# Patient Record
Sex: FEMALE | Race: BLACK OR AFRICAN AMERICAN | Employment: OTHER | URBAN - METROPOLITAN AREA
[De-identification: names, ages, dates, MRNs, and addresses within clinical notes are randomized per-mention and may not be internally consistent; named-entity substitution may affect disease eponyms.]

---

## 2023-01-12 ENCOUNTER — OFFICE VISIT (OUTPATIENT)
Dept: HEMATOLOGY | Age: 63
End: 2023-01-12
Payer: MEDICAID

## 2023-01-12 VITALS
OXYGEN SATURATION: 98 % | TEMPERATURE: 97.6 F | HEIGHT: 63 IN | WEIGHT: 180.4 LBS | BODY MASS INDEX: 31.96 KG/M2 | HEART RATE: 71 BPM | RESPIRATION RATE: 18 BRPM | SYSTOLIC BLOOD PRESSURE: 142 MMHG | DIASTOLIC BLOOD PRESSURE: 78 MMHG

## 2023-01-12 DIAGNOSIS — B19.10 HEPATITIS B INFECTION WITHOUT DELTA AGENT WITHOUT HEPATIC COMA, UNSPECIFIED CHRONICITY: Primary | ICD-10-CM

## 2023-01-12 RX ORDER — ATORVASTATIN CALCIUM 20 MG/1
20 TABLET, FILM COATED ORAL DAILY
COMMUNITY
Start: 2022-12-07

## 2023-01-12 RX ORDER — MONTELUKAST SODIUM 10 MG/1
TABLET ORAL
COMMUNITY
Start: 2022-11-09

## 2023-01-12 RX ORDER — FLUTICASONE FUROATE, UMECLIDINIUM BROMIDE AND VILANTEROL TRIFENATATE 200; 62.5; 25 UG/1; UG/1; UG/1
POWDER RESPIRATORY (INHALATION)
COMMUNITY
Start: 2022-12-22

## 2023-01-12 RX ORDER — AMLODIPINE BESYLATE 5 MG/1
5 TABLET ORAL DAILY
COMMUNITY
Start: 2022-11-25

## 2023-01-12 RX ORDER — ALBUTEROL SULFATE 90 UG/1
AEROSOL, METERED RESPIRATORY (INHALATION)
COMMUNITY
Start: 2022-12-26

## 2023-01-12 RX ORDER — ACETAMINOPHEN 500 MG
TABLET ORAL DAILY
COMMUNITY

## 2023-01-12 NOTE — PROGRESS NOTES
Identified pt with two pt identifiers(name and ). Reviewed record in preparation for visit and have obtained necessary documentation. Chief Complaint   Patient presents with    New Patient     Establish care      Vitals:    23 0943   BP: (!) 142/78   Pulse: 71   Resp: 18   Temp: 97.6 °F (36.4 °C)   TempSrc: Temporal   SpO2: 98%   Weight: 180 lb 6.4 oz (81.8 kg)   Height: 5' 3\" (1.6 m)   PainSc:   0 - No pain       Health Maintenance Review: Patient reminded of \"due or due soon\" health maintenance. I have asked the patient to contact his/her primary care provider (PCP) for follow-up on his/her health maintenance. Coordination of Care Questionnaire:  :   1) Have you been to an emergency room, urgent care, or hospitalized since your last visit? If yes, where when, and reason for visit? no       2. Have seen or consulted any other health care provider since your last visit? If yes, where when, and reason for visit? NO      Patient is accompanied by daughter I have received verbal consent from Raisa Chaudhary to discuss any/all medical information while they are present in the room.

## 2023-01-12 NOTE — PROGRESS NOTES
[facility] [address] [facility], [place], [place], [facility] [place]      MERI Sandoval S Chela, AGPCNP-BC   Jesus Penaloza, [facility]-   Masha rAzate, FNP-TERRIE Weston, FNP-C   Sharon Balbuena, AGPCNP-BC      [address]   at [address], [address]   [phone]   FAX: [address]   at [address], [address] [place] [address], [address]   [phone]   FAX: [phone]         Patient Care Team:  Sree Jarquin NP as PCP - General (Nurse Practitioner)      Problem List  Date Reviewed: 2016            Codes Class Noted    Essential hypertension ICD-10-CM: I10  ICD-9-CM: 401.9  2016        Allergic rhinitis ICD-10-CM: J30.9  ICD-9-CM: 477.9  2016        Major depressive disorder ICD-10-CM: F32.9  ICD-9-CM: 296.20  2016        Vitamin D deficiency ICD-10-CM: E55.9  ICD-9-CM: 268.9  2016         Routine testing during COVID vaccination. Had 30 years ago, undetected. Resolved again. Screen for HCV & HBV. Scelral interus, no idea of risk factor. Mom:HTN, DMII, strove  Dad: HTN, DMII, heart attack  Daughter passed, liver cancer, HCV. Single, 2 kids, one   CMA, report   Cigarette  Social    The clinicians listed above have asked me to see Ijeoma Kelley in consultation regarding chronic HBV and its management. All medical records sent by the referring physicians were reviewed   including imaging studies   and pathology. No medical records were available for review when the patient was here for the appointment. The patient is a 60y.o. year old [de-identified] female who has tested positive for HBV approximatley 30 years ago. Risk factors for acquiring HBV are not apparent.        There was an episode of acute icteric hepatitis at the time of initial diagnosis . Imaging of the liver was not performed. An assessment of liver fibrosis with biopsy or elastography has not been performed. The patient has never received treatment for chronic HBV. The patient does not have any symptoms which could be attributed to the liver disorder. has the following symptoms which could be attributed to the liver disorder:    fatigue,   fevers,   chills,   shortness of breath,   chest pain,   pain in the right side over the liver,   diffuse abdominal pain,   nausea,   vomiting,   constipation,   diarrhea,   dry eyes,   dry mouth,   arthralgias,   myalgias,   yellowing of the eyes or skin,   itching,   dark urine,   problems concentrating,   swelling of the abdomen,   swelling of the lower extremities,   hematemesis,   hematochezia. The patient is not experiencing the following symptoms which are commonly seen in this liver disorder:   fatigue,   fevers,   chills,   shortness of breath,   chest pain,   pain in the right side over the liver,   diffuse abdominal pain,   nausea,   vomiting,   constipation,   diarrhea,   dry eyes,   dry mouth,   arthralgias,   myalgias,   yellowing of the eyes or skin,   itching,   dark urine,   problems concentrating,   swelling of the abdomen,   swelling of the lower extremities,   hematemesis,   hematochezia. The patient   completes all daily activities without any functional limitations. has   Mild   Moderate   Severe   limitations in functional activities which can be attributed to   the liver disease   and   to other medical problems that are not related to the liver disease. ASSESSMENT AND PLAN:  Chronic HBV  This is   E-antigen positive active   E-antigen negative active   E-antigen negative inactive   HBV. It has not yet been determined if this is active or inactive HBV. There is   no fibrosis. portal fibrosis. bridging fibrosis. cirrhosis.     The degree of fibrosis has not yet been determined. Liver transaminases are   normal.    elevated. The AST is  normal.    elevated. The ALT is  normal.    elevated. The ALP is  normal.    elevated. Liver function is   normal.    depressed. Total bilirubin is   normal.    elevated. Serum albumin is   normal.    depressed. INR is   normal.    prolonged. The platelet count is   normal.    depressed. Based upon laboratory studies   elastography,   and imaging   the patient   does not appear to have   appears to have   may have   significant liver linjury  advanced liver disease   or  cirrhosis. The level of HBV DNA   is ***.  has not yet been determined. The patient   is not currently receiving treatment for HBV. is receiving treatment with   Will initiate treatment with   tenofovir (Viread)  tenofovir (Vemlidy). entecavir. peginterferon. The patient does not require treatment for HBV because this is inactive disease. The patient does not need treatment for HBV prior to delivery of the baby. With such low levels of HBV DNA there is essentially no risk for the baby to be infected as long as the baby is vaccinated for HBV with HBIG within 1 hour of birth and initiation of the first of 3 doses of HBV vaccine. The patient  is responding to   and tolerating treatment without significant side effects. treatment but having side effects which include ***.  is not responding to the treatment. Will perform   Have performed   laboratory testing to monitor liver function and degree of liver injury. This   will include   has included   BMP,   hepatic panel,   CBC with platelet count,   INR. Will perform   serologic   and   virologic   testing of HBV to assess need for treatment, type of treatment, likelihood of responding to treatment and the duration of therapy. Will perform serologic   and   virologic   studies   to assess for other causes of chronic liver disease.       Will perform imaging of the liver with   ultrasound. triple phase CT scan. dynamic MRI. The need to perform a liver biopsy to confirm the diagnosis, assess severity, and determine treatment options were discussed. The risks of performing the liver biopsy including pain, puncture of the lung, gallbladder, intestine or kidney and bleeding were discussed. The patient has decided to have a liver biopsy. This will be scheduled. Will defer liver biopsy for now. There is no reason to perform liver biopsy at this time. Liver chemistries will be monitored. The patient had a liver biopsy performed in ***/***. We will request that the liver biopsy slides be sent to me for my personal review. Discussed possible participation in clinical trials for treatment of chronic HBV. The patient is interested in receiving treatment through one of the clinical trials if eligible. Screening for Hepatocellular Carcinoma  HBV is a cancer producing virus and all patients with chronic HBV need to be screened for [address] whether they have cirrhosis or not. When under age 48 years I prefer to screen with ultrasound every 12 months. Since the risk of [address] in persons with chronic HBV increases significantly after the age of 48 years ultrasound should be performed every 6 months. Screening for Hepatocellular Carcinoma  HCC screening   is not necessary if the patient has no evidence of cirrhosis. has not been not been performed   since ***/***.  was performed in ***/*** and   does not suggest [address] demonstrates   an elevation in AFP. a lesion on ultrasound. AFP was ordered today and ultrasound will be scheduled. Will repeat ultrasound in 6 months. Will perform   triple phase CT scan      dynamic MRI   to further characterize the lesion and help determine if this is HCC.             Treatment of other medical problems in patients with chronic liver disease  There are no contraindications for the patient to take most medications that are necessary for treatment of other medical issues. The patient has cirrhrosis and should avoid taking NSAIDs which are associated with a higher rate of developing JOVANY. The patient had HE and should avoid taking Benzodiazapines which could exacerbate HE. The patient can take   any medications utilized for treatment of DM  statins to treat hypercholesterolemia    The patient has alcohol induced liver disease but has been abstinent from alcohol for greater than 6 months. Normal doses of acetaminophen, as recommended on the label of the bottle, are not hepatotoxic except in the setting of daily alcohol use, even in patients with cirrhosis and can be utilized for pain. The patient consumes alcohol on a daily basis or has recently stopped consuming alcohol. Regular alcohol use increases the risk of toxicity from acetaminophen. This analgesic should be avoided until the patient has been abstinent from alcohol for 6 months. Counseling for alcohol in patients with chronic liver disease  The patient was counseled regarding alcohol consumption and the effect of alcohol on chronic liver disease. The patient has cirrhosis and was advised to be abstinent from all alcohol including non-alcoholic beer which does contain some alcohol. The patient does not consume any significant amount of alcohol. The patient has not consumed alcohol since ***. The patient has continued to consume alcohol   daily. on rare social occasions. The patient was reminded that alcohol can cause fatty liver. Patients who have undergone obesity surgery are at much greater risk to develop alcohol induced liver injury. The patient does not have a chronic liver disease and does not have to be abstinent from alcohol. The patient consumes too much alcohol and is at risk to develop alcohol induced liver liver injury.   It was recommended that all alcohol consumption be stopped and the patient be abstinent from alcohol for at least *** months. If the patient cannot stop consuming alcohol then there is an aclohol use disorder and the patient should consider entering alcohol counseling and/or attending AA. The patient has an alcohol abuse disorder and it was suggested that they enter alcohol counseling and/or attend AA. If the patient cannot stop drinking alcohol they cannot be considered a candidate for liver transplant. The patient will need to attend alcohol counceling prior to being accepted as a liver transplant candidate. Substance Use  The patient was counseled regarding the risk of overdose and death from using opioids and other narcotic drugs. Discussed the risk of becoming reinfected with HCV once they are cured if they resume IV drug use or inhaling drugs nasally. The patient has not used drugs since ***/***. We would like the patient to demonstrate 6 months of abstinence from drug use and to have completed a drug abuse program prior to initiating HCV treatment. The patient is actively using narcotic drugs and was referred to a drug abuse program prior to initiating HCV treatment. There is no contraindication to treating HCV in patients who are actively using drugs and the SVR/cure rate is the same as persons who no not use drugs. Vaccinations   Vaccination for viral hepatitis A and B is recommended since the patient has no serologic evidence of previous exposure or vaccination with immunity. Vaccination for viral hepatitis A and B is not needed. The patient has serologic evidence of prior exposure or vaccination with immunity. Vaccination for viral hepatitis A and B has been initiated. Vaccination for viral hepatitis A and B has been completed. Serologic studies will be performed to assess response to vaccine. The need for vaccination against viral hepatitis A and B will be assessed with serologic and instituted as appropriate.   Since the patient does not have a chronic liver disease which can lead to liver injury screening for HAV and HBV is not needed. The patient has   not   received   2 doses   and the booster dose   of COVID-19 vaccine. The patient should receive a booster dose of COVID-19 vaccine in ***/***. The patient had COVID-19 infection and recovered. The patient does not want to take COVID-19 vaccine. The patient was encouraged to take the COVID-19 vaccine. Routine vaccinations against other bacterial and viral agents can be performed as indicated. Annual flu vaccination should be administered if indicated. ALLERGIES  No Known Allergies    MEDICATIONS  Current Outpatient Medications   Medication Sig    albuterol (PROVENTIL HFA, VENTOLIN HFA, PROAIR HFA) 90 mcg/actuation inhaler INHALE 1 PUFF BY MOUTH EVERY 4 HOURS AS NEEDED    amLODIPine (NORVASC) 5 mg tablet Take 5 mg by mouth daily. atorvastatin (LIPITOR) 20 mg tablet Take 20 mg by mouth daily. Trelegy Ellipta 200-62.5-25 mcg inhaler INHALE 1 PUFF BY MOUTH EVERY DAY    montelukast (SINGULAIR) 10 mg tablet TAKE 1 TABLET BY MOUTH EVERY DAY MUST CALL MD FOR APPOINTMENT    cholecalciferol (VITAMIN D3) (2,000 UNITS /50 MCG) cap capsule Take  by mouth daily. citalopram (CELEXA) 40 mg tablet Take 40 mg by mouth daily. potassium chloride SR (KLOR-CON 10) 10 mEq tablet Take 20 mEq by mouth. fexofenadine (ALLEGRA ALLERGY) 180 mg tablet Take 1 Tab by mouth daily. Indications: SEASONAL ALLERGIC RHINITIS    lisinopril-hydrochlorothiazide (PRINZIDE, ZESTORETIC) 20-25 mg per tablet Take  by mouth daily. (Patient not taking: Reported on 1/12/2023)    medroxyPROGESTERone (PROVERA) 2.5 mg tablet Take  by mouth daily. (Patient not taking: Reported on 1/12/2023)    potassium chloride (KLOR-CON) 20 mEq pack Take 20 mEq by mouth two (2) times a day.  (Patient not taking: Reported on 1/12/2023)    fluticasone (FLONASE) 50 mcg/actuation nasal spray 2 sprays each nostril daily (Patient not taking: Reported on 2023)     No current facility-administered medications for this visit. SYSTEM REVIEW NOT RELATED TO LIVER DISEASE OR REVIEWED ABOVE:  Constitution systems: Negative for fever, chills, weight gain, weight loss. Eyes: Negative for visual changes. ENT: Negative for sore throat, painful swallowing. Respiratory: Negative for cough, hemoptysis, SOB. Cardiology: Negative for chest pain, palpitations. GI:  Negative for constipation or diarrhea. : Negative for urinary frequency, dysuria, hematuria, nocturia. Skin: Negative for rash. Hematology: Negative for easy bruising, blood clots. Musculo-skelatal: Negative for back pain, muscle pain, weakness. Neurologic: Negative for headaches, dizziness, vertigo, memory problems not related to HE. Psychology: Negative for anxiety, depression. FAMILY HISTORY:  The patient is adopted and does not know any of her family history. The father    Has/had the following following chronic disease(s): ***.     of ***.    unknown cause. The patient has no knowledge of the father's medical condition. The mother   Has/had the following chronic disease(s): ***.     of ***.    unknown cause. The patient has no knowledge of the mother's medical condition. There is no family history of liver disease. The following family members have liver disease: There is no family history of immune disorders. The following family members have immune disorders:        SOCIAL HISTORY:  The patient   is . has never been . is . is . is . The   spouse   partner   has   has not   been checked for HBV   and is   positive.    negative. The patient has  no children. 1 child. *** children,   *** stepchildren,   and   *** grandchildren. The spouse   has  not   been tested for HBV. The spouse has prior HBV exposure and does not require vaccination.   The spouse should be tested and vaccinated for HBV if not previously exposed. The children   have   not  been vaccinated for HBV. It is not known if the children have been vaccinated for HBV. The patient   has never used tobacco products. stopped using tobacco products in ***/***.    currently smokes *** pack of tobacco daily. currently smokes *** cigarettes daily. currently smokes *** cigars daily. currently chews tobacco.      The patient   has never consumed significant amounts of alcohol. consumes *** alcoholic beverages per day / week. consumes alcohol in excess. consumes alcohol on social occasions never in excess. has previously consumed alcohol in excess. has previously consumed alcohol socially never in excess. The patient has been abstinent from alcohol since ***/***. The patient   currently works full time ***.    currently works part time ***.    used to work ***.    does not work. The patient   retired in ***.    has not worked since [employer] The patient is   applying for disability. currently receiving disability. PHYSICAL EXAMINATION:  Visit Vitals  BP (!) 142/78 (BP 1 Location: Left upper arm, BP Patient Position: Sitting, BP Cuff Size: Adult)   Pulse 71   Temp 97.6 °F (36.4 °C) (Temporal)   Resp 18   Ht 5' 3\" (1.6 m)   Wt 180 lb 6.4 oz (81.8 kg)   LMP  (LMP Unknown)   SpO2 98%   BMI 31.96 kg/m²     General: No acute distress. Eyes: Sclera anicteric. ENT: No oral lesions. Thyroid normal.  Nodes: No adenopathy. Skin: No spider angiomata. No jaundice. No palmar erythema. Respiratory: Lungs clear to auscultation. Cardiovascular: Regular heart rate. No murmurs. No JVD. Abdomen: Soft non-tender. Liver size normal to percussion/palpation. Spleen not palpable. No obvious ascites. Extremities: No edema. No muscle wasting. No gross arthritic changes. Neurologic: Alert and oriented. Cranial nerves grossly intact. No asterixis.     LABORATORY STUDIES:  Recent liver function panel, CBC with platelet count and BMP are not available. These studies will be performed. SEROLOGIES:  Not available or performed. Testing was performed today. LIVER HISTOLOGY:  Not available or performed    ENDOSCOPIC PROCEDURES:  Not available or performed    RADIOLOGY:  Not available or performed    OTHER TESTING:  Not available or performed    FOLLOW-UP:  All of the issues listed above in the Assessment and Plan were discussed with the patient. All questions were answered. The patient expressed a clear understanding of the above. [facility] in ***   weeks   months   for Fibroscan   for elastography   2 weeks after liver biopsy. which should be 1-2 weeks after the next imaging study. and to initiate HCV treatment. to assess for the effects of diet changes and weight loss. to assess the effects and tolerability of ***.  for screening and enrollment into a clinical trial for treatment of ***. The patient was given a follow-up appointment for *** months in case she decides not to enter or is excluded from entering the clinical trial.  for routine monitoring. to review all data and determine the treatment plan.

## 2023-01-12 NOTE — Clinical Note
2/4/2023    Patient: Nena Turner   YOB: 1960   Date of Visit: 1/12/2023     Luca Tavares NP  [address] [address]  Via Fax: [phone]    Dear Luca Tavares NP,      Thank you for referring Ms. Nena Turner to [facility] for evaluation. My notes for this consultation are attached. If you have questions, please do not hesitate to call me. I look forward to following your patient along with you.       Sincerely,    Prabhakar Swan MD

## 2023-01-13 LAB
ALBUMIN SERPL-MCNC: 5.1 G/DL (ref 3.8–4.8)
ALP SERPL-CCNC: 133 IU/L (ref 44–121)
ALT SERPL-CCNC: 17 IU/L (ref 0–32)
AST SERPL-CCNC: 20 IU/L (ref 0–40)
BASOPHILS # BLD AUTO: 0.1 X10E3/UL (ref 0–0.2)
BASOPHILS NFR BLD AUTO: 0 %
BILIRUB DIRECT SERPL-MCNC: <0.1 MG/DL (ref 0–0.4)
BILIRUB SERPL-MCNC: 0.3 MG/DL (ref 0–1.2)
BUN SERPL-MCNC: 11 MG/DL (ref 8–27)
BUN/CREAT SERPL: 13 (ref 12–28)
CALCIUM SERPL-MCNC: 10.3 MG/DL (ref 8.7–10.3)
CHLORIDE SERPL-SCNC: 98 MMOL/L (ref 96–106)
CO2 SERPL-SCNC: 26 MMOL/L (ref 20–29)
CREAT SERPL-MCNC: 0.84 MG/DL (ref 0.57–1)
EGFR: 78 ML/MIN/1.73
EOSINOPHIL # BLD AUTO: 0.2 X10E3/UL (ref 0–0.4)
EOSINOPHIL NFR BLD AUTO: 1 %
ERYTHROCYTE [DISTWIDTH] IN BLOOD BY AUTOMATED COUNT: 14.4 % (ref 11.7–15.4)
GLUCOSE SERPL-MCNC: 74 MG/DL (ref 70–99)
HAV AB SER QL IA: NEGATIVE
HBV CORE AB SERPL QL IA: POSITIVE
HBV E AB SERPL QL IA: POSITIVE
HBV E AG SERPL QL IA: NEGATIVE
HBV SURFACE AB SER QL: REACTIVE
HBV SURFACE AG SERPL QL IA: NEGATIVE
HCT VFR BLD AUTO: 42.3 % (ref 34–46.6)
HGB BLD-MCNC: 13.7 G/DL (ref 11.1–15.9)
IMM GRANULOCYTES # BLD AUTO: 0 X10E3/UL (ref 0–0.1)
IMM GRANULOCYTES NFR BLD AUTO: 0 %
LYMPHOCYTES # BLD AUTO: 3.4 X10E3/UL (ref 0.7–3.1)
LYMPHOCYTES NFR BLD AUTO: 25 %
MCH RBC QN AUTO: 26.9 PG (ref 26.6–33)
MCHC RBC AUTO-ENTMCNC: 32.4 G/DL (ref 31.5–35.7)
MCV RBC AUTO: 83 FL (ref 79–97)
MONOCYTES # BLD AUTO: 0.9 X10E3/UL (ref 0.1–0.9)
MONOCYTES NFR BLD AUTO: 7 %
NEUTROPHILS # BLD AUTO: 9.1 X10E3/UL (ref 1.4–7)
NEUTROPHILS NFR BLD AUTO: 67 %
PLATELET # BLD AUTO: 337 X10E3/UL (ref 150–450)
POTASSIUM SERPL-SCNC: 3.5 MMOL/L (ref 3.5–5.2)
PROT SERPL-MCNC: 8.4 G/DL (ref 6–8.5)
RBC # BLD AUTO: 5.1 X10E6/UL (ref 3.77–5.28)
SODIUM SERPL-SCNC: 144 MMOL/L (ref 134–144)
WBC # BLD AUTO: 13.7 X10E3/UL (ref 3.4–10.8)

## 2023-01-15 LAB
HBV DNA SERPL NAA+PROBE-ACNC: <10 IU/ML
HBV DNA SERPL NAA+PROBE-LOG IU: NORMAL LOG10 IU/ML
REF LAB TEST REF RANGE: NORMAL

## 2023-01-18 LAB — HDV AB SER QL IA: NEGATIVE

## 2023-05-19 RX ORDER — ATORVASTATIN CALCIUM 20 MG/1
20 TABLET, FILM COATED ORAL DAILY
COMMUNITY
Start: 2022-12-07

## 2023-05-19 RX ORDER — AMLODIPINE BESYLATE 5 MG/1
5 TABLET ORAL DAILY
COMMUNITY
Start: 2022-11-25

## 2023-05-19 RX ORDER — FEXOFENADINE HCL 180 MG/1
180 TABLET ORAL DAILY
COMMUNITY
Start: 2016-08-24

## 2023-05-19 RX ORDER — FLUTICASONE FUROATE, UMECLIDINIUM BROMIDE AND VILANTEROL TRIFENATATE 200; 62.5; 25 UG/1; UG/1; UG/1
1 POWDER RESPIRATORY (INHALATION) DAILY
COMMUNITY
Start: 2022-12-22

## 2023-05-19 RX ORDER — MONTELUKAST SODIUM 10 MG/1
TABLET ORAL
COMMUNITY
Start: 2022-11-09

## 2023-05-19 RX ORDER — ALBUTEROL SULFATE 90 UG/1
AEROSOL, METERED RESPIRATORY (INHALATION)
COMMUNITY
Start: 2022-12-26

## 2023-05-19 RX ORDER — CITALOPRAM 40 MG/1
40 TABLET ORAL DAILY
COMMUNITY

## 2023-05-19 RX ORDER — POTASSIUM CHLORIDE 750 MG/1
20 TABLET, FILM COATED, EXTENDED RELEASE ORAL
COMMUNITY

## 2024-05-09 LAB
HBA1C MFR BLD HPLC: 6.5 %
LDL CHOLESTEROL, EXTERNAL: 87

## 2024-06-19 ENCOUNTER — OFFICE VISIT (OUTPATIENT)
Age: 64
End: 2024-06-19

## 2024-06-19 VITALS
SYSTOLIC BLOOD PRESSURE: 132 MMHG | TEMPERATURE: 97.9 F | HEART RATE: 74 BPM | DIASTOLIC BLOOD PRESSURE: 68 MMHG | BODY MASS INDEX: 31.86 KG/M2 | HEIGHT: 63 IN | WEIGHT: 179.8 LBS | OXYGEN SATURATION: 98 %

## 2024-06-19 DIAGNOSIS — E21.0 PRIMARY HYPERPARATHYROIDISM (HCC): ICD-10-CM

## 2024-06-19 DIAGNOSIS — Z13.820 OSTEOPOROSIS SCREENING: ICD-10-CM

## 2024-06-19 DIAGNOSIS — E83.52 HYPERCALCEMIA: Primary | ICD-10-CM

## 2024-06-19 NOTE — PROGRESS NOTES
[facility] DIABETES AND ENDOCRINOLOGY               Pam Briones MD          Patient Information  Name : Emilia Hernández 64 y.o.     YOB: 1960         Referred by: Daisy Mcclain APRN - NP       Chief Complaint   Patient presents with    Thyroid Problem    Diabetes         History of present illness:    Emilia Hernández is a 64 y.o. female here for evaluation of hypercalcemia.  She was found to have calcium of 10.4 with elevated PTH  No h/o excess calcium or vitamin D,vitamin A intake  No nausea,abdominal pain  No polyuria,polydipsia, nocturia  No h/o HCTZ,lithium,theophylline usage  No h/o kidney stones,PUD  No h/o osteoporosis,fragility fractures  No family h/o of calcium disorders or nephrolithiasis or FHH          Past Medical History:   Diagnosis Date    COVID-19 2021    Depression     HTN (hypertension)     Hypokalemia     Vitamin D deficiency        Current Outpatient Medications   Medication Sig    metFORMIN (GLUCOPHAGE) 500 MG tablet Take 1 tablet by mouth daily (with breakfast)    Cyanocobalamin (VITAMIN B-12 CR PO) Take 1 tablet by mouth daily    albuterol sulfate HFA (PROVENTIL;VENTOLIN;PROAIR) 108 (90 Base) MCG/ACT inhaler INHALE 1 PUFF BY MOUTH EVERY 4 HOURS AS NEEDED    amLODIPine (NORVASC) 5 MG tablet Take 1 tablet by mouth daily    atorvastatin (LIPITOR) 20 MG tablet Take 1 tablet by mouth daily    Cholecalciferol 50 MCG (2000 UT) CAPS Take by mouth daily    citalopram (CELEXA) 40 MG tablet Take 1 tablet by mouth daily    fluticasone-umeclidin-vilant (TRELEGY ELLIPTA) 200-62.5-25 MCG/ACT AEPB inhaler Inhale 1 puff into the lungs daily    montelukast (SINGULAIR) 10 MG tablet TAKE 1 TABLET BY MOUTH EVERY DAY MUST CALL MD FOR APPOINTMENT    potassium chloride (KLOR-CON) 10 MEQ extended release tablet Take 2 tablets by mouth    fexofenadine (ALLEGRA) 180 MG tablet Take 1 tablet by mouth daily (Patient not taking: Reported on 6/19/2024)     No current facility-administered medications for

## 2024-06-19 NOTE — PATIENT INSTRUCTIONS
I have ordered scan/test and if you do not hear from the hospital in 3- 4 business days  please call the number [phone] to speak with the scheduling team directly.

## 2024-07-03 ENCOUNTER — HOSPITAL ENCOUNTER (OUTPATIENT)
Facility: HOSPITAL | Age: 64
Discharge: HOME | End: 2024-07-06
Attending: INTERNAL MEDICINE
Payer: MEDICARE

## 2024-07-03 DIAGNOSIS — E83.52 HYPERCALCEMIA: ICD-10-CM

## 2024-07-03 DIAGNOSIS — Z13.820 OSTEOPOROSIS SCREENING: ICD-10-CM

## 2024-07-03 DIAGNOSIS — E21.0 PRIMARY HYPERPARATHYROIDISM (HCC): ICD-10-CM

## 2024-07-03 PROCEDURE — 77080 DXA BONE DENSITY AXIAL: CPT
